# Patient Record
Sex: FEMALE | Race: WHITE
[De-identification: names, ages, dates, MRNs, and addresses within clinical notes are randomized per-mention and may not be internally consistent; named-entity substitution may affect disease eponyms.]

---

## 2017-01-18 ENCOUNTER — HOSPITAL ENCOUNTER (OUTPATIENT)
Dept: HOSPITAL 61 - PCVCIMAG | Age: 47
Discharge: HOME | End: 2017-01-18
Attending: INTERNAL MEDICINE
Payer: COMMERCIAL

## 2017-01-18 DIAGNOSIS — R07.9: ICD-10-CM

## 2017-01-18 DIAGNOSIS — R00.2: Primary | ICD-10-CM

## 2017-01-18 DIAGNOSIS — Z82.49: ICD-10-CM

## 2017-01-18 PROCEDURE — 93351 STRESS TTE COMPLETE: CPT

## 2017-01-18 PROCEDURE — 93325 DOPPLER ECHO COLOR FLOW MAPG: CPT

## 2019-08-16 ENCOUNTER — HOSPITAL ENCOUNTER (OUTPATIENT)
Dept: HOSPITAL 61 - PCVCIMAG | Age: 49
Discharge: HOME | End: 2019-08-16
Attending: INTERNAL MEDICINE
Payer: COMMERCIAL

## 2019-08-16 DIAGNOSIS — I10: Primary | ICD-10-CM

## 2019-08-16 PROCEDURE — 76770 US EXAM ABDO BACK WALL COMP: CPT

## 2019-08-16 PROCEDURE — 93975 VASCULAR STUDY: CPT

## 2019-08-16 NOTE — PCVCIMAG
EXAM: BILATERAL RENAL ULTRASOUND AND BILATERAL RENAL DUPLEX



INDICATION: Hypertension



FINDINGS: 

Right kidney: Length measures 11.3 cm. No hydronephrosis or extensive

renal scarring.



Right renal duplex: Adequate technical quality. No sonographic

evidence of renal artery stenosis. The aortic to renal artery ratio is

2.0. The renal vein is patent.



Left kidney: Length measures 11.2 cm. No hydronephrosis or extensive

renal scarring.



Left renal duplex: Adequate technical quality. No sonographic evidence

of renal artery stenosis. The aortic to renal artery ratio is 1.9. The

renal vein is patent.



Bladder: No obvious abnormalities.



IMPRESSION: 

No significant renal artery stenosis.

No hydronephrosis bilaterally.



LOC:OFFICE